# Patient Record
Sex: MALE | Race: WHITE
[De-identification: names, ages, dates, MRNs, and addresses within clinical notes are randomized per-mention and may not be internally consistent; named-entity substitution may affect disease eponyms.]

---

## 2019-06-27 ENCOUNTER — HOSPITAL ENCOUNTER (OUTPATIENT)
Dept: HOSPITAL 84 - D.HCCARDIO | Age: 54
Discharge: HOME | End: 2019-06-27
Attending: INTERNAL MEDICINE
Payer: COMMERCIAL

## 2019-06-27 DIAGNOSIS — I20.9: Primary | ICD-10-CM

## 2019-07-01 NOTE — ST
PATIENT:LEVI VILLA                            MEDICAL RECORD: [id number]
SEX: M                                                   LOCATION:[facility]         
ORDER #:                                                 ADMISSION DATE: 06/27/19
AGE OF PATIENT: 54            
 
REFERRING PHYSICIAN:                                                             
 
INTERPRETING PHYSICIAN: CARLIN ANDERSON MD             

 
 
DATE OF SERVICE:  06/27/2019
 
NUCLEAR STRESS TEST
 
INDICATION:  Angina, shortness of breath, hyperlipidemia.  He was exercised on
standard Griffin protocol for 7 minutes 45 seconds achieving greater than 85% max
target heart rate response with 31 mCi of sestamibi injected at peak stress 10
mCi were used previously for rest images.
 
FINDINGS:  Gated SPECT reveals preserved ejection fraction at 62% with good wall
motioning and thickening and brightening throughout all segments.  SPECT imaging
Cardiolite was used as myocardial fusion agent.  There is definite reversibility
inferiorly and apically.  This includes the basal, mid, apical, inferior
segments as well as the apex itself.   The degree of reversibility is moderate. 
The amount of myocardial involved is moderate.
 
OVERALL IMPRESSION:
1.  This is an abnormal nuclear stress test in the intermediate risk range with
moderate amount of myocardial involved with reversibility inferiorly and
apically.
2.  Gated SPECT reveals a preserved ejection fraction at 62%.  In this patient
with ongoing symptomatology, the current scan does suggest presence of
hemodynamically significant coronary artery disease.
 
TRANSINT:[id number] Voice Confirmation ID: [id number] DOCUMENT ID: [id number]
 
 
                                           
                                           CARLIN ANDERSON MD             
 
 
 
Electronically Signed by CARLIN ANDERSON on 07/01/19 at 0950
 
 
 
 
 
 
CC:                                                             [id number]
DICTATION DATE: 06/28/19 1132     :     06/28/19 2328      DEP CLI 
                                                                      06/27/19
[id number] [place] [address]

## 2019-07-12 ENCOUNTER — HOSPITAL ENCOUNTER (OUTPATIENT)
Dept: HOSPITAL 84 - D.CATH | Age: 54
Discharge: HOME | End: 2019-07-12
Attending: INTERNAL MEDICINE
Payer: COMMERCIAL

## 2019-07-12 VITALS — WEIGHT: 250.52 LBS | BODY MASS INDEX: 35.86 KG/M2 | BODY MASS INDEX: 35.86 KG/M2 | HEIGHT: 70 IN

## 2019-07-12 VITALS — DIASTOLIC BLOOD PRESSURE: 82 MMHG | SYSTOLIC BLOOD PRESSURE: 155 MMHG

## 2019-07-12 DIAGNOSIS — Z01.812: ICD-10-CM

## 2019-07-12 DIAGNOSIS — I25.119: Primary | ICD-10-CM

## 2019-07-12 LAB
ANION GAP SERPL CALC-SCNC: 12.3 MMOL/L (ref 8–16)
BASOPHILS NFR BLD AUTO: 1.2 % (ref 0–2)
BUN SERPL-MCNC: 18 MG/DL (ref 7–18)
CALCIUM SERPL-MCNC: 8.9 MG/DL (ref 8.5–10.1)
CHLORIDE SERPL-SCNC: 105 MMOL/L (ref 98–107)
CO2 SERPL-SCNC: 25.7 MMOL/L (ref 21–32)
CREAT SERPL-MCNC: 0.9 MG/DL (ref 0.6–1.3)
EOSINOPHIL NFR BLD: 6.3 % (ref 0–7)
ERYTHROCYTE [DISTWIDTH] IN BLOOD BY AUTOMATED COUNT: 13.2 % (ref 11.5–14.5)
GLUCOSE SERPL-MCNC: 104 MG/DL (ref 74–106)
HCT VFR BLD CALC: 42.4 % (ref 42–54)
HGB BLD-MCNC: 14.8 G/DL (ref 13.5–17.5)
IMM GRANULOCYTES NFR BLD: 0.2 % (ref 0–5)
LYMPHOCYTES NFR BLD AUTO: 33.7 % (ref 15–50)
MCH RBC QN AUTO: 30.7 PG (ref 26–34)
MCHC RBC AUTO-ENTMCNC: 34.9 G/DL (ref 31–37)
MCV RBC: 88 FL (ref 80–100)
MONOCYTES NFR BLD: 9.4 % (ref 2–11)
NEUTROPHILS NFR BLD AUTO: 49.2 % (ref 40–80)
OSMOLALITY SERPL CALC.SUM OF ELEC: 279 MOSM/KG (ref 275–300)
PLATELET # BLD: 227 10X3/UL (ref 130–400)
PMV BLD AUTO: 9.1 FL (ref 7.4–10.4)
POTASSIUM SERPL-SCNC: 4 MMOL/L (ref 3.5–5.1)
RBC # BLD AUTO: 4.82 10X6/UL (ref 4.2–6.1)
SODIUM SERPL-SCNC: 139 MMOL/L (ref 136–145)
WBC # BLD AUTO: 4.9 10X3/UL (ref 4.8–10.8)

## 2019-07-12 NOTE — NUR
RIGHT RADIAL TR BAND IN PLACE. NO BLEEDING/HEMATOMA NOTED. VSS.
FAMILY AT BEDSIDE. PT DENIES NAUSEA OR PAIN. SET UP WITH SANDWICH
TRAY AND DRINK.

## 2019-07-12 NOTE — HEMODYNAMI
PATIENT:LEVI VILLA                                   MEDICAL RECORD: [id number]
: 65                                            LOCATION:D.CAT          
ACCT# [id number]                                       ADMISSION DATE: 19
 
 
 Generatedon:20199:31
Patient name: LEVI VILLA Patient #: [id number] Visit #: [id number] SSN: YOB: 1965 Date
of study: 2019
Page: Of
Hemodynamic Procedure Report
****************************
Patient Data
Patient Demographics
Procedure consent was obtained
First Name: LEVI         Gender: Male
Last Name: DAISY           : 1965
Patient #: [id number]       Age: 54 year(s)
Visit #: [id number]       Race: Unknown
Accession #:
[id number]
Additional ID: [id number]
Contact details
Address: [address] [place] [address]  Phone: [phone]
rd
State: [place]
City: [place]
Zip code: [address]
Past Medical History
Allergies: No known allergies
Admission
Admission Data
Admission Date: 2019   Admission Time: 7:36
Weight (lbs.): 250
Weight (kg.): 113.4
Lab Results
Lab Result Date: 2019  Lab Result Time: 0:00
Biochemistry
Name         Units    Result                Min      Max
BUN          mg/dl    18       --(---*)--   7        18
Creatinine   mg/dl    0.9      --(-*--)--   0.6      1.3
CBC
Name         Units    Result                Min      Max
Hematocrit   %        42.4     --(*---)--   42       54
Hemoglobin   g/dl     14.8     --(-*--)--   13.5     17.5
Procedure
Procedure Types
Cath Procedure
Diagnostic Procedure
LHC
LHC w/Coronaries
FFR/IVUS
FFR Initial
Sedation Charges
Moderate Sedation up to 15 minutes
Procedure Description
Procedure Date
Procedure Date: 2019
Procedure Start Time: 9:08
 
Procedure End Time: 9:25
Procedure Staff
Name                            Function
Bryan Guthrie MD                Performing Physician
Jon Hernandez RT                  Scrub
Deirdre Desir RT                Scrub
Lani Rodriguez RT               Monitor
Meena Trujillo RN                Circulator
Procedure Data
Cath Procedure
Fluoroscopy
Diagnostic fluoroscopy      Total fluoroscopy Time: 3.7
time: 3.7 min               min
Diagnostic fluoroscopy      Total fluoroscopy dose: 851
dose: 851 mGy               mGy
Contrast Material
Contrast Material Type                       Amount (ml)
Isovue 300                                   100
Entry Location
Entry     Primary  Successful  Side  Size  Upsize Upsize Entry    Closure     Nieto
ccessful  Closure
Location                             (Fr)  1 (Fr) 2 (Fr) Remarks  Device        
          Remarks
Radial                         Right 6 Fr                         Mechanical
artery                               Short                        Compression
Estimated blood loss: 5 ml
Diagnostic catheters
Device Type               Used For           End Catheter
Placement
DIAGNOSTIC [place] 110cm 5  Procedure
Fr catheter (756476)
Procedure Complications
No complications
Procedure Medications
Medication           Administration Route Dosage
0.9% NaCl            I.V.                 100 ml/hr
Oxygen               etCO2 Nasal cannula  2 l/min
Lidocaine 2%         added to field       20
Heparin Flush Bag    added to field       2 bags
(1000units/500ml NS)
Radial Cocktail      added to field       1 syringe
(Verapamil 2mg/Nitro
400mcg/Heparin
1500units)
Versed               I.V.                 2 mg
Fentanyl             I.V.                 50 mcg
Fentanyl             I.V.                 50 mcg
Hemodynamics
Rest
HGB: 14.8 (g/dl) Heart Rate: 52 (bpm)
Snapshots
Pre Cath      Intra         NCS           Post Cath
Vital Signs
Time    Heart  Resp   SPO2 etCO2   NIBP (mmHg)  Rhythm  Pain    Sedation
Rate   (ipm)  (%)  (mmHg)                       Status  Level
(bpm)
8:50:53 53     14     100  36.6    174/98(141)  SB      0 (11)  10(A)
, No
pain
8:55:17 58     13     97   25.4    162/104(120) SB      0 (11)  10(A)
 
, No
pain
8:59:25 50     13     97   13.4    134/95(114)  SB      0 (11)  10(A)
, No
pain
9:03:41 54     10     97   23.9    140/90(112)  SB      0 (11)  10(A)
, No
pain
9:07:57 56     10     97   24.6    137/99(113)  SB      0 (11)  10(A)
, No
pain
9:12:15 59     14     98   28.4    137/86(105)  SB      0 (11)  9(A)
, No
pain
9:16:33 65     13     97   26.1    138/87(104)  SB      0 (11)  9(A)
, No
pain
9:20:55 63     11     98   23.9    131/77(100)  SB      0 (11)  10(A)
, No
pain
9:25:13 61     11     95   26.1    137/82(97)   SB      0 (11)  10(A)
, No
pain
Medications
Time    Medication       Route   Dose    Verified Delivered Reason     Notes  Ef
fectiveness
by       by
8:50:06 0.9% NaCl        I.V.    100     Bryan  Meena     used for
ml/hr   Jerri Trujillo   procedure
RN
8:50:14 Oxygen           etCO2   2 l/min Bryan  Meena     used for
Nasal           Jerri Trujillo   procedure
cannula                  RN
8:50:19 Lidocaine 2%     added   20ml    Bryan  Bryan   for local
to      vial    Jerri Guthrie MD  anesthetic
field
8:50:23 Heparin Flush    added   2 bags  Bryan  Bryan   used for
Bag              to              Jerri Guthrie MD  procedure
(1000units/500ml field
NS)
8:50:28 Radial Cocktail  added   1       Bryan  Bryan   used for
(Verapamil       to      syringe Jerri Guthrie MD  procedure
2mg/Nitro        field
400mcg/Heparin
1500units)
9:05:09 Versed           I.V.    2 mg    Bryan  Meena     for
Jerri Trujillo   sedation
RN
9:05:14 Fentanyl         I.V.    50 mcg  Bryan  Meena     for
Jerri Trujillo   sedation
RN
9:10:22 Fentanyl         I.V.    50 mcg  Bryan  Meena     for
Jerri Trujillo   sedation
RN
Procedure Log
Time     Note
8:20:24  Signed procedure consent form obtained from patient.
8:20:25  Time tracking: Regular hours (M-F 7:00 - 5:00)
8:20:28  Plan of Care:Hemodynamics will remain stable., Cardiac
rhythm will remain stable., Comfort level will be
 
maintained., Respiratory function will remain
adequate., Patient/ family verbilizes understanding of
procedure., Procedure tolerated without complication.,
Recovers from procedure without complications..
8:20:31  Diagnostic Cath status Elective
8:20:40  Patient allergic to No known allergies
8:21:01  Patient Weight : 250 lbs
8:30:33  Jon BILLS(R) sent for patient. Start room use.
8:42:51  Patient received from Pre/Post Procedure Room to CCL 1
Alert and oriented. Tansferred to table in Supine
position.
8:42:53  Warm blankets applied, and mitchell hugger turned on for
patient comfort.
8:42:53  Correct patient and procedure confirmed by team.
8:42:54  ECG and BP/O2 sat monitors applied to patient.
8:49:32  Vital chart was started
8:50:06  0.9% NaCl 100 ml/hr I.V. was administered by Meena Trujillo RN; used for procedure;
8:50:14  Oxygen 2 l/min etCO2 Nasal cannula was administered by
Meena Trujillo RN; used for procedure;
8:50:19  Lidocaine 2% 20ml vial added to field was administered
by Bryan Guthrie MD; for local anesthetic;
8:50:23  Heparin Flush Bag (1000units/500ml NS) 2 bags added to
field was administered by Bryan Guthrie MD; used for
procedure;
8:50:28  Radial Cocktail (Verapamil 2mg/Nitro 400mcg/Heparin
1500units) 1 syringe added to field was administered
by Bryan Guthrie MD; used for procedure;
8:51:16  Baseline sample Acquired.
8:51:19  Rhythm: sinus bradycardia
8:51:20  Full Disclosure recording started
8:51:25  H&P Date Dictated: 7/3/2019 Within 30 days and on
chart., H&P Addendum completed by physician on day of
procedure. (MUST COMPLETE FOR ALL OUTPATIENTS).
8:51:27  Pre-procedure instructions explained to patient.
8:51:28  Pre-op teaching completed and patient verbalized
understanding.
8:51:29  Family in patients room.
8:51:30  Patient NPO since Midnight.
8:51:33  Is patient on blood thinner?No
8:51:35  Patient diabetic? No.
8:51:38  Previous problem with sedation/anesthesia? No ?
8:51:39  Snore? Yes
8:51:41  Sleep apnea? No
8:51:42  Deviated septum? No
8:51:42  Opens mouth fully? Yes
8:51:43  Sticks out tongue? Yes
8:51:45  Airway obstruction? No ?
8:51:49  Dentures? No ?
8:51:53  Modified Nathen's test Ulnar < 7 seconds
8:51:55  Patient pain scale 0/10 ?.
8:52:02  IV patent on arrival in right antecubital with 0.9%
NaCl at [facility].
8:53:06  Lab Result : BUN 18 mg/dl
8:53:06  Lab Result : Hemoglobin 14.8 g/dl
8:53:06  Lab Result : Creatinine 0.9 mg/dl
8:53:06  Lab Result : Hematocrit 42.4 %
8:55:31  Lab results completed and on chart.
8:55:36  Right Radial & Right Groin area was prepped with
chlora-prep and draped in sterile fashion
 
8:55:36  Alarms reviewed by R. N.
8:55:37  Sharps counted by scrub and verified by R.N.
8:56:07  Use device set Radial Dx or PCI
8:56:09  ACIST Syringe (13799) opened to sterile field.
8:56:11  Bag Decanter (2002S) opened to sterile field.
8:56:11  ACIST Hand Control (23833) opened to sterile field.
8:56:11  ACIST Manifold (16238) opened to sterile field.
8:56:12  Tegaderm 4 x 4 (1626W) opened to sterile field.
8:56:17  Medline Cath Pack ([id number]) opened to sterile field.
8:56:18  MBrace Wrist Support ([id number]) opened to sterile
field.
8:56:36  EMERALD Guide Wire (226-811) opened to sterile field.
8:56:53  SHEATH 6FR RAIN (9022848) opened to sterile field.
9:04:39  --------ALL STOP TIME OUT------
9:04:40  Final Timeout: patient, procedure, and site verified
with staff and physician. All members of the team are
in agreement.
9:04:42  Right Radial & Right Groin site verified by team.
9:04:45  Fire Safety Assessment: A--An alcohol-based skin
anteseptic being used preoperatively., C--Open oxygen
or nitrous oxide is being used., D--An ESU, laser, or
fiber-optic light is being used.
9:04:47  Physical assessment completed. ASA score P 2 - A
patient with mild systemic disease as per Bryan Guthrie MD.
9:04:50  1) 90+ Normal kidney functon but urine findings or
structural abnormalities or genetic trait point to
kidney disease.
9:04:53  Maximum allowable contrast does (3.7 X eGFR X 0.75)250
ml.
9:04:57  Sedation plan: IV Moderate Sedation Medication:Versed,
Fentanyl
9:05:09  Versed 2 mg I.V. was administered by Meena Ronnie RN;
for sedation;
9:05:14  Fentanyl 50 mcg I.V. was administered by Meena Trujillo
RN; for sedation;
9:08:12  Procedure started.
9:08:39  Local anesthetic to right radial artery with Lidocaine
2% by Bryan Guthrie MD.**INITIAL ACCESS ONLY**
9:08:52  Baseline sample Acquired.
9:10:22  Fentanyl 50 mcg I.V. was administered by Meena Trujillo
RN; for sedation;
9:11:02  A 6 Fr Short sheath was inserted into the Right Radial
artery
9:11:15  A DIAGNOSTIC Tiger 110cm 5 Fr catheter (537994) was
advanced over the wire and used for Procedure.
9:12:21  LV gram done using KNIGHT
9:12:24  Injector settings: Ml/sec: 5, Volume: 15,
9:12:47  EF : 60 %
9:13:21  RCA angiography performed.
9:13:23  Catheter exchanged over wire.
9:13:32  UNABLE TO ENGAGE LCA
9:13:53  GUIDE 6FR XBLAD 3.5 SH catheter ([id number]) opened to
sterile field.
9:14:05  6 Fr XBLAD 3.5 SH guide catheter was inserted over the
wire
9:18:53  LCA angiography performed.
9:18:57  Volcano Verrata Plus pressure wire (35677R) opened to
sterile field.
9:19:01  INFLATOR Merit BasixCompak (XL2399) opened to sterile
 
field.
9:21:31  FFR/IFR wire advanced.
9:21:32  Wire advanced across lesion.
9:22:13  Diag1 lesion measured at .93 with IFR
9:22:39  Wire removed.
9:22:40  Guide catheter removed.
9:23:00  Procedure ended.(Physican Out)
9:23:09  TR BAND Standard ([id number]) opened to sterile field.
9:23:22  Sheath removed intact; hemostasis achieved with
Mechanical Compression to the Right Radial artery.
9:23:28  Fluoroscopy time 03.70 minutes.
9:23:30  TR band inflated with 8cc of air.
9:23:33  Flurop Dose total: 851
9:23:33  Fluoroscopy dose: 851 mGy
9:23:37  Contrast amount:Isovue 300 100ml.
9:23:39  Sharps counted by scrub and verified by R.N.
9:23:45  Post-procedure physical assessment completed. ASA
score P 2 - A patient with mild systemic disease as
per Bryan Guthrie MD.
9:23:52  Post procedure rhythm: unchanged.
9:23:56  Estimated blood loss: 5 ml
9:23:57  Post procedure instruction explained to
patient.Patient verbalizes understanding.
9:23:58  Patient needs reinforcement of post procedure
teaching.
9:24:18  Procedure type changed to Cath procedure, Diagnostic
procedure, LHC, LHC w/Coronaries, FFR/IVUS, FFR
Initial, Sedation Charges, Moderate Sedation up to 15
minutes
9:25:05  Procedure and supply charges have been captured,
reviewed, submitted and are correct.
9:25:07  Procedure Complication : No complications
9:25:09  Vital chart was stopped
9:25:09  See physician's report for complete and final results.
9:25:11  Report given to Pre/Post Procedure Room.
9:25:14  Patient transfered to Pre/Post Procedure Room with
Bed.
9:25:16  Procedure ended.
9:25:16  Full Disclosure recording stopped
9:25:18  End room use (Document Last)
Device Usage
Item Name   Manufacture  Quantity  Catalog      Hospital Part       Current Mini
mal Lot# /
Number       Charge   Number     Stock   Stock   Serial#
Code
ACIST       Acist        1         40742        527241   170748     421013  20
Syringe     Medical
(89555)     Systems Inc
Bag         Microtek     1                 865786   79318      233529  5
Decanter    Medical Inc.
()
ACIST Hand  Acist        1         66289        687080   884273     025750  5
Control     Medical
(97098)     Systems Inc
ACIST       Acist        1         31135        251328   723503     504320  5
Manifold    Medical
(72331)     Systems Inc
Tegaderm 4  3M           1         1626W        764621   948773     967843  5
x 4 (1626W)
Medline     Medline      1         [id number]    216546   35828      887282  5
 
Cath Pack
([id number])
MBrace      Advanced     1         140-0250-00  038371   33991      101802  5
Wrist       Vascular
Support     Dynamics
(022327354)
EMERALD     Cardinal     1         145-135      035334   947028     344022  5
Guide Wire  Health
(125-734)
SHEATH 6FR  Cardinal     1         8935709      436209   6909393    202866  5
RAIN        [facility]
(0953427)
DIAGNOSTIC  Terumo       1               507513   573748     191548  5
Tiger 110cm
5 Fr
catheter
(707639)
GUIDE 6FR   Cardinal     1         46358170     531177   004973     148229  3
XBLAD 3.5   Health
 catheter
(34996289)
Volcano     [place]      1         78869L       379110   [id number]  973813  5
Verrata
Plus
pressure
wire
(16682N)
INFLATOR    Merit        1         PC4564       327726   544590     057049  15
[facility]
BasixCompak
(ZG5360)
TR BAND     Terumo       1         YLB42-KXU    625132   779729     338039  40
Standard
([id number])
Signature Audit [place]
Stage           Time        Signature      Unsigned
Intra-Procedure 2019   Lani Rodriguez
9:31:23 AM  RT(R)
Signatures
Monitor : Lani Rodriguez Signature :
RT                       ______________________________
Date : ______________ Time :
______________
 
 
 
 
 
 
 
 
 
 
 
 
[address] [place] [address]

## 2019-07-12 NOTE — NUR
4cc OF AIR REMOVED FROM TR BAND. PT TOLERATED WELL. RIGHT AC PIV
D/C'D WITH CATH TIP INTACT. PT TOLERATED WELL. VSS. PT INSTRUCTED TO
GET UP AND DRESSED. FAMILY AT BEDSIDE TO ASSIST. NO BLEEDING/HEMATOMA
NOTED TO RIGHT WRIST.

## 2019-07-12 NOTE — NUR
TR BAND REMOVED. DRESSING APPLIED. NO BLEEDING/HEMATOMA NOTED. RIGHT
WRIST BRACE IN PLACE. PT INSTRUCTED TO KEEP ON FOR TWO HOURS AFTER
ARRIVAL HOME. HE VOICED UNDERSTANDING.

## 2019-07-12 NOTE — NUR
RIGHT WRIST DRESSING C/D/I. NO S/S OF HEMATOMA NOTED. PT AMBULATED TO
RESTROOM. VOIDED WITHOUT DIFFICULTY. DISCUSSED DISCHARGE INSTRUCTIONS
WITH PT AND PT'S FAMILY. THEY VOICED UNDERSTANDING. PT TAKEN OUT TO
VEHILCE BY WHEELCHAIR. NO S/S OF DISTRESS NOTED. ALL BELONGINGS AND
PAPERWORK IN HAND.

## 2019-07-15 NOTE — OP
PATIENT NAME:  LEVI VILLA                            MEDICAL RECORD: [id number]
:65                                             LOCATION:D.CAT          
                                                         ADMISSION DATE:        
SURGEON:  CARLIN ANDERSON MD             
 
 
DATE OF OPERATION:  2019
 
PROCEDURES:
1.  Left heart catheterization.
2.  Selective coronary angiography.
3.  Left ventriculogram.
4.  IFR.
 
INDICATION:  Angina, abnormal nuclear stress test.
 
PROCEDURE IN DETAIL:  After informed consent was obtained and after a detailed
description of risks, benefits as well as alternative therapies, the patient
elected to proceed with angiogram and heart catheterization.  The right radial
area was prepped and draped in normal sterile fashion.  Right radial artery was
cannulated via modified Seldinger technique with placement of 6-Indonesian sheath. 
All catheters exchanged through this sheath.
 
FINDINGS:  Left ventriculogram was performed in standard 30-degree KNIGHT view,
reveals good cardiac wall motion throughout all segments.  Overall ejection
fraction estimated at 60%.
 
SELECTIVE CORONARY ANGIOGRAPHY:
1.  Left main is with no significant angiographic disease.
2.  Left anterior descending has mild irregularities, but no flow-limiting
stenosis.  The diagonal has a questionable stenosis; however, IFR was normal.
3.  Left circumflex has no significant angiographic disease.
4.  Right coronary has no significant angiographic disease.
 
OVERALL IMPRESSION:  Minimal coronary artery disease is present.  No
flow-limiting stenosis.  Continue medical management of the cardiac risk
factors.
 
TRANSINT:[id number] Voice Confirmation ID: [id number] DOCUMENT ID: [id number]
                                           
                                           CARLIN ANDERSON MD             
 
 
 
Electronically Signed by CARLIN ANDERSON on 07/15/19 at 1838
 
 
 
 
 
CC:                                                             [phone]
DICTATION DATE: 19 0927     :     19 1017      [facility] CLI 
                                                                      19
[address] [address]